# Patient Record
Sex: FEMALE | Race: WHITE | NOT HISPANIC OR LATINO | Employment: FULL TIME | URBAN - METROPOLITAN AREA
[De-identification: names, ages, dates, MRNs, and addresses within clinical notes are randomized per-mention and may not be internally consistent; named-entity substitution may affect disease eponyms.]

---

## 2024-05-19 ENCOUNTER — ANESTHESIA (OUTPATIENT)
Dept: PERIOP | Facility: HOSPITAL | Age: 64
End: 2024-05-19
Payer: COMMERCIAL

## 2024-05-19 ENCOUNTER — ANESTHESIA EVENT (OUTPATIENT)
Dept: PERIOP | Facility: HOSPITAL | Age: 64
End: 2024-05-19
Payer: COMMERCIAL

## 2024-05-19 ENCOUNTER — HOSPITAL ENCOUNTER (INPATIENT)
Facility: HOSPITAL | Age: 64
LOS: 1 days | Discharge: HOME | End: 2024-05-20
Attending: INTERNAL MEDICINE | Admitting: INTERNAL MEDICINE
Payer: COMMERCIAL

## 2024-05-19 ENCOUNTER — APPOINTMENT (OUTPATIENT)
Dept: GENERAL RADIOLOGY | Facility: HOSPITAL | Age: 64
End: 2024-05-19
Payer: COMMERCIAL

## 2024-05-19 PROBLEM — N13.2 HYDRONEPHROSIS CONCURRENT WITH AND DUE TO CALCULI OF KIDNEY AND URETER: Status: ACTIVE | Noted: 2024-05-19

## 2024-05-19 LAB
ANION GAP SERPL CALCULATED.3IONS-SCNC: 11 MMOL/L (ref 5–15)
BASOPHILS # BLD AUTO: 0.02 10*3/MM3 (ref 0–0.2)
BASOPHILS NFR BLD AUTO: 0.2 % (ref 0–1.5)
BUN SERPL-MCNC: 13 MG/DL (ref 8–23)
BUN/CREAT SERPL: 15.3 (ref 7–25)
CALCIUM SPEC-SCNC: 9.1 MG/DL (ref 8.6–10.5)
CHLORIDE SERPL-SCNC: 105 MMOL/L (ref 98–107)
CO2 SERPL-SCNC: 24 MMOL/L (ref 22–29)
CREAT SERPL-MCNC: 0.85 MG/DL (ref 0.57–1)
DEPRECATED RDW RBC AUTO: 41.4 FL (ref 37–54)
EGFRCR SERPLBLD CKD-EPI 2021: 76.6 ML/MIN/1.73
EOSINOPHIL # BLD AUTO: 0.1 10*3/MM3 (ref 0–0.4)
EOSINOPHIL NFR BLD AUTO: 0.9 % (ref 0.3–6.2)
ERYTHROCYTE [DISTWIDTH] IN BLOOD BY AUTOMATED COUNT: 14.1 % (ref 12.3–15.4)
GLUCOSE BLDC GLUCOMTR-MCNC: 139 MG/DL (ref 70–105)
GLUCOSE BLDC GLUCOMTR-MCNC: 414 MG/DL (ref 70–105)
GLUCOSE BLDC GLUCOMTR-MCNC: 92 MG/DL (ref 70–105)
GLUCOSE SERPL-MCNC: 127 MG/DL (ref 65–99)
HCT VFR BLD AUTO: 34.9 % (ref 34–46.6)
HGB BLD-MCNC: 10.7 G/DL (ref 12–15.9)
IMM GRANULOCYTES # BLD AUTO: 0.07 10*3/MM3 (ref 0–0.05)
IMM GRANULOCYTES NFR BLD AUTO: 0.6 % (ref 0–0.5)
LYMPHOCYTES # BLD AUTO: 2.26 10*3/MM3 (ref 0.7–3.1)
LYMPHOCYTES NFR BLD AUTO: 20.4 % (ref 19.6–45.3)
MCH RBC QN AUTO: 24.6 PG (ref 26.6–33)
MCHC RBC AUTO-ENTMCNC: 30.7 G/DL (ref 31.5–35.7)
MCV RBC AUTO: 80.2 FL (ref 79–97)
MONOCYTES # BLD AUTO: 0.71 10*3/MM3 (ref 0.1–0.9)
MONOCYTES NFR BLD AUTO: 6.4 % (ref 5–12)
NEUTROPHILS NFR BLD AUTO: 7.93 10*3/MM3 (ref 1.7–7)
NEUTROPHILS NFR BLD AUTO: 71.5 % (ref 42.7–76)
NRBC BLD AUTO-RTO: 0 /100 WBC (ref 0–0.2)
PLATELET # BLD AUTO: 158 10*3/MM3 (ref 140–450)
PMV BLD AUTO: 11.7 FL (ref 6–12)
POTASSIUM SERPL-SCNC: 3.1 MMOL/L (ref 3.5–5.2)
RBC # BLD AUTO: 4.35 10*6/MM3 (ref 3.77–5.28)
SODIUM SERPL-SCNC: 140 MMOL/L (ref 136–145)
WBC NRBC COR # BLD AUTO: 11.09 10*3/MM3 (ref 3.4–10.8)

## 2024-05-19 PROCEDURE — 63710000001 INSULIN GLARGINE PER 5 UNITS: Performed by: UROLOGY

## 2024-05-19 PROCEDURE — 76000 FLUOROSCOPY <1 HR PHYS/QHP: CPT

## 2024-05-19 PROCEDURE — 82948 REAGENT STRIP/BLOOD GLUCOSE: CPT

## 2024-05-19 PROCEDURE — 0 IOHEXOL 300 MG/ML SOLUTION: Performed by: UROLOGY

## 2024-05-19 PROCEDURE — BT141ZZ FLUOROSCOPY OF KIDNEYS, URETERS AND BLADDER USING LOW OSMOLAR CONTRAST: ICD-10-PCS | Performed by: UROLOGY

## 2024-05-19 PROCEDURE — 25010000002 CEFTRIAXONE PER 250 MG: Performed by: UROLOGY

## 2024-05-19 PROCEDURE — 25010000002 ONDANSETRON PER 1 MG

## 2024-05-19 PROCEDURE — 25010000002 FENTANYL CITRATE (PF) 100 MCG/2ML SOLUTION: Performed by: ANESTHESIOLOGY

## 2024-05-19 PROCEDURE — 25010000002 CEFAZOLIN PER 500 MG: Performed by: ANESTHESIOLOGY

## 2024-05-19 PROCEDURE — C1758 CATHETER, URETERAL: HCPCS | Performed by: UROLOGY

## 2024-05-19 PROCEDURE — 85025 COMPLETE CBC W/AUTO DIFF WBC: CPT

## 2024-05-19 PROCEDURE — 25010000002 DEXAMETHASONE PER 1 MG: Performed by: ANESTHESIOLOGY

## 2024-05-19 PROCEDURE — 87040 BLOOD CULTURE FOR BACTERIA: CPT

## 2024-05-19 PROCEDURE — C2617 STENT, NON-COR, TEM W/O DEL: HCPCS | Performed by: UROLOGY

## 2024-05-19 PROCEDURE — 25010000002 PROPOFOL 200 MG/20ML EMULSION: Performed by: ANESTHESIOLOGY

## 2024-05-19 PROCEDURE — 0T788DZ DILATION OF BILATERAL URETERS WITH INTRALUMINAL DEVICE, VIA NATURAL OR ARTIFICIAL OPENING ENDOSCOPIC: ICD-10-PCS | Performed by: UROLOGY

## 2024-05-19 PROCEDURE — 80048 BASIC METABOLIC PNL TOTAL CA: CPT

## 2024-05-19 PROCEDURE — C1769 GUIDE WIRE: HCPCS | Performed by: UROLOGY

## 2024-05-19 RX ORDER — AMOXICILLIN 250 MG
2 CAPSULE ORAL 2 TIMES DAILY PRN
Status: DISCONTINUED | OUTPATIENT
Start: 2024-05-19 | End: 2024-05-20 | Stop reason: HOSPADM

## 2024-05-19 RX ORDER — ATORVASTATIN CALCIUM 40 MG/1
80 TABLET, FILM COATED ORAL NIGHTLY
Status: DISCONTINUED | OUTPATIENT
Start: 2024-05-19 | End: 2024-05-20 | Stop reason: HOSPADM

## 2024-05-19 RX ORDER — ATORVASTATIN CALCIUM 80 MG/1
1 TABLET, FILM COATED ORAL NIGHTLY
COMMUNITY
Start: 2024-04-22

## 2024-05-19 RX ORDER — UREA 10 %
5 LOTION (ML) TOPICAL NIGHTLY PRN
Status: DISCONTINUED | OUTPATIENT
Start: 2024-05-19 | End: 2024-05-20 | Stop reason: HOSPADM

## 2024-05-19 RX ORDER — NICOTINE POLACRILEX 4 MG
15 LOZENGE BUCCAL
Status: DISCONTINUED | OUTPATIENT
Start: 2024-05-19 | End: 2024-05-20 | Stop reason: HOSPADM

## 2024-05-19 RX ORDER — LOSARTAN POTASSIUM 25 MG/1
1 TABLET ORAL DAILY
COMMUNITY
Start: 2024-04-23

## 2024-05-19 RX ORDER — SERTRALINE HYDROCHLORIDE 100 MG/1
100 TABLET, FILM COATED ORAL DAILY
Status: DISCONTINUED | OUTPATIENT
Start: 2024-05-19 | End: 2024-05-20 | Stop reason: HOSPADM

## 2024-05-19 RX ORDER — INSULIN GLARGINE 100 [IU]/ML
40 INJECTION, SOLUTION SUBCUTANEOUS DAILY
COMMUNITY
Start: 2024-05-08

## 2024-05-19 RX ORDER — DEXAMETHASONE SODIUM PHOSPHATE 4 MG/ML
INJECTION, SOLUTION INTRA-ARTICULAR; INTRALESIONAL; INTRAMUSCULAR; INTRAVENOUS; SOFT TISSUE AS NEEDED
Status: DISCONTINUED | OUTPATIENT
Start: 2024-05-19 | End: 2024-05-19 | Stop reason: SURG

## 2024-05-19 RX ORDER — DEXTROSE MONOHYDRATE 25 G/50ML
25 INJECTION, SOLUTION INTRAVENOUS
Status: DISCONTINUED | OUTPATIENT
Start: 2024-05-19 | End: 2024-05-20 | Stop reason: HOSPADM

## 2024-05-19 RX ORDER — INSULIN LISPRO 100 [IU]/ML
3-14 INJECTION, SOLUTION INTRAVENOUS; SUBCUTANEOUS EVERY 6 HOURS SCHEDULED
Status: DISCONTINUED | OUTPATIENT
Start: 2024-05-19 | End: 2024-05-19

## 2024-05-19 RX ORDER — FENTANYL CITRATE 50 UG/ML
INJECTION, SOLUTION INTRAMUSCULAR; INTRAVENOUS AS NEEDED
Status: DISCONTINUED | OUTPATIENT
Start: 2024-05-19 | End: 2024-05-19 | Stop reason: SURG

## 2024-05-19 RX ORDER — ACETAMINOPHEN 325 MG/1
650 TABLET ORAL EVERY 4 HOURS PRN
Status: DISCONTINUED | OUTPATIENT
Start: 2024-05-19 | End: 2024-05-20 | Stop reason: HOSPADM

## 2024-05-19 RX ORDER — EZETIMIBE 10 MG/1
1 TABLET ORAL NIGHTLY
COMMUNITY
Start: 2024-05-08

## 2024-05-19 RX ORDER — SERTRALINE HYDROCHLORIDE 100 MG/1
1 TABLET, FILM COATED ORAL NIGHTLY
COMMUNITY
Start: 2024-05-09

## 2024-05-19 RX ORDER — IBUPROFEN 600 MG/1
1 TABLET ORAL
Status: DISCONTINUED | OUTPATIENT
Start: 2024-05-19 | End: 2024-05-20 | Stop reason: HOSPADM

## 2024-05-19 RX ORDER — SODIUM CHLORIDE 9 MG/ML
40 INJECTION, SOLUTION INTRAVENOUS AS NEEDED
Status: DISCONTINUED | OUTPATIENT
Start: 2024-05-19 | End: 2024-05-20 | Stop reason: HOSPADM

## 2024-05-19 RX ORDER — ACETAMINOPHEN 160 MG/5ML
650 SOLUTION ORAL EVERY 4 HOURS PRN
Status: DISCONTINUED | OUTPATIENT
Start: 2024-05-19 | End: 2024-05-20 | Stop reason: HOSPADM

## 2024-05-19 RX ORDER — LOSARTAN POTASSIUM 25 MG/1
25 TABLET ORAL DAILY
Status: DISCONTINUED | OUTPATIENT
Start: 2024-05-19 | End: 2024-05-20 | Stop reason: HOSPADM

## 2024-05-19 RX ORDER — PROPOFOL 10 MG/ML
INJECTION, EMULSION INTRAVENOUS AS NEEDED
Status: DISCONTINUED | OUTPATIENT
Start: 2024-05-19 | End: 2024-05-19 | Stop reason: SURG

## 2024-05-19 RX ORDER — BISACODYL 5 MG/1
5 TABLET, DELAYED RELEASE ORAL DAILY PRN
Status: DISCONTINUED | OUTPATIENT
Start: 2024-05-19 | End: 2024-05-20 | Stop reason: HOSPADM

## 2024-05-19 RX ORDER — FENTANYL CITRATE 50 UG/ML
25 INJECTION, SOLUTION INTRAMUSCULAR; INTRAVENOUS
Status: DISCONTINUED | OUTPATIENT
Start: 2024-05-19 | End: 2024-05-19 | Stop reason: HOSPADM

## 2024-05-19 RX ORDER — SODIUM CHLORIDE 0.9 % (FLUSH) 0.9 %
10 SYRINGE (ML) INJECTION AS NEEDED
Status: DISCONTINUED | OUTPATIENT
Start: 2024-05-19 | End: 2024-05-20 | Stop reason: HOSPADM

## 2024-05-19 RX ORDER — ATENOLOL 50 MG/1
100 TABLET ORAL DAILY
Status: DISCONTINUED | OUTPATIENT
Start: 2024-05-19 | End: 2024-05-20 | Stop reason: HOSPADM

## 2024-05-19 RX ORDER — ONDANSETRON 2 MG/ML
4 INJECTION INTRAMUSCULAR; INTRAVENOUS EVERY 6 HOURS PRN
Status: DISCONTINUED | OUTPATIENT
Start: 2024-05-19 | End: 2024-05-19

## 2024-05-19 RX ORDER — SODIUM CHLORIDE 0.9 % (FLUSH) 0.9 %
10 SYRINGE (ML) INJECTION EVERY 12 HOURS SCHEDULED
Status: DISCONTINUED | OUTPATIENT
Start: 2024-05-19 | End: 2024-05-20 | Stop reason: HOSPADM

## 2024-05-19 RX ORDER — BISACODYL 10 MG
10 SUPPOSITORY, RECTAL RECTAL DAILY PRN
Status: DISCONTINUED | OUTPATIENT
Start: 2024-05-19 | End: 2024-05-20 | Stop reason: HOSPADM

## 2024-05-19 RX ORDER — ONDANSETRON 2 MG/ML
4 INJECTION INTRAMUSCULAR; INTRAVENOUS EVERY 4 HOURS PRN
Status: DISCONTINUED | OUTPATIENT
Start: 2024-05-19 | End: 2024-05-20 | Stop reason: HOSPADM

## 2024-05-19 RX ORDER — POLYETHYLENE GLYCOL 3350 17 G/17G
17 POWDER, FOR SOLUTION ORAL DAILY PRN
Status: DISCONTINUED | OUTPATIENT
Start: 2024-05-19 | End: 2024-05-20 | Stop reason: HOSPADM

## 2024-05-19 RX ORDER — ATENOLOL 100 MG/1
100 TABLET ORAL DAILY
COMMUNITY
Start: 2024-05-09

## 2024-05-19 RX ORDER — ACETAMINOPHEN 650 MG/1
650 SUPPOSITORY RECTAL EVERY 4 HOURS PRN
Status: DISCONTINUED | OUTPATIENT
Start: 2024-05-19 | End: 2024-05-20 | Stop reason: HOSPADM

## 2024-05-19 RX ORDER — INSULIN LISPRO 100 [IU]/ML
3-14 INJECTION, SOLUTION INTRAVENOUS; SUBCUTANEOUS
Status: DISCONTINUED | OUTPATIENT
Start: 2024-05-20 | End: 2024-05-20 | Stop reason: HOSPADM

## 2024-05-19 RX ADMIN — DEXAMETHASONE SODIUM PHOSPHATE 4 MG: 4 INJECTION, SOLUTION INTRA-ARTICULAR; INTRALESIONAL; INTRAMUSCULAR; INTRAVENOUS; SOFT TISSUE at 14:09

## 2024-05-19 RX ADMIN — Medication 10 ML: at 10:40

## 2024-05-19 RX ADMIN — ATORVASTATIN CALCIUM 80 MG: 40 TABLET, FILM COATED ORAL at 20:48

## 2024-05-19 RX ADMIN — PROPOFOL 200 MG: 10 INJECTION, EMULSION INTRAVENOUS at 13:58

## 2024-05-19 RX ADMIN — ONDANSETRON 4 MG: 2 INJECTION INTRAMUSCULAR; INTRAVENOUS at 14:14

## 2024-05-19 RX ADMIN — ACETAMINOPHEN 650 MG: 325 TABLET, FILM COATED ORAL at 19:38

## 2024-05-19 RX ADMIN — CEFTRIAXONE 1000 MG: 1 INJECTION, POWDER, FOR SOLUTION INTRAMUSCULAR; INTRAVENOUS at 20:49

## 2024-05-19 RX ADMIN — Medication 10 ML: at 20:49

## 2024-05-19 RX ADMIN — ONDANSETRON 4 MG: 2 INJECTION INTRAMUSCULAR; INTRAVENOUS at 10:39

## 2024-05-19 RX ADMIN — CEFAZOLIN 2 G: 2 INJECTION, POWDER, FOR SOLUTION INTRAMUSCULAR; INTRAVENOUS at 14:05

## 2024-05-19 RX ADMIN — INSULIN GLARGINE 30 UNITS: 100 INJECTION, SOLUTION SUBCUTANEOUS at 20:48

## 2024-05-19 RX ADMIN — FENTANYL CITRATE 50 MCG: 50 INJECTION, SOLUTION INTRAMUSCULAR; INTRAVENOUS at 14:06

## 2024-05-19 NOTE — ANESTHESIA POSTPROCEDURE EVALUATION
Patient: Yolanda Lovett    Procedure Summary       Date: 05/19/24 Room / Location: [facility] OR 11 / [facility] MAIN OR    Anesthesia Start: 1356 Anesthesia Stop: 1445    Procedure: CYSTOSCOPY, BILATERAL RETROGRADE PYELOGRAMS, BILATERAL URETERAL STENTS (Bilateral) Diagnosis:     Surgeons: Randal Cano MD Provider: Lexa Adames MD    Anesthesia Type: general ASA Status: 3 - Emergent            Anesthesia Type: general    Vitals  Vitals Value Taken Time   /72 05/19/24 1533   Temp 96.9 °F (36.1 °C) 05/19/24 1445   Pulse 75 05/19/24 1537   Resp 16 05/19/24 1525   SpO2 97 % 05/19/24 1537   Vitals shown include unfiled device data.        Post Anesthesia Care and Evaluation    Patient location during evaluation: PACU  Patient participation: complete - patient participated  Level of consciousness: awake  Pain score: 0  Pain management: adequate  Anesthetic complications: No anesthetic complications  PONV Status: none  Cardiovascular status: acceptable  Respiratory status: acceptable  Hydration status: acceptable

## 2024-05-19 NOTE — PLAN OF CARE
Goal Outcome Evaluation:  Plan of Care Reviewed With: patient        Progress: no change  Outcome Evaluation: Patient was a direct admit this morning from [facility]. She was admitted for bilateral kidney stones with hydronephrosis. Patient was allowed to have a snack when she got here and then was made NPO. Urology consult placed. IV Rocephin to start 5/19 @ 2100 since she received a dose at [facility] before coming over. Patient c/o a 2/10 pain and some slight nausea. Will continue to monitor.

## 2024-05-19 NOTE — H&P
[facility] Medicine Services  History & Physical    Patient Name: Yolanda Lovett  : 1960  MRN: [id number]  Primary Care Physician:  Provider, No Known  Date of admission: 2024  Date and Time of Service: 2024 at 0245      Assessment & Plan      Chief Complaint: abdominal pain    Plan:    Bilateral Ureter Calculi with Hydronephrosis  UTI  Lower back pain radiating to the LLQ  -CT of the abdomen and pelvis reviewed, 3 adjacent calculi noted in the left mid ureter measuring up to 8.6 mm with moderate hydronephrosis and hydroureter, and perinephric edema.  The right distal ureter shows a collection of urinary calculi measuring up to 4.2 mm at the UVJ with moderate hydronephrosis and hydroureter and perinephric edema  -UA shows 6-10 WBC, trace bacteria  -WBC 15.8, monitor  -Blood cultures ordered  -Rocephin ordered  -N.p.o. for possible procedure  -Analgesics and antiemetics as needed  -[facility] consulted    Diabetes Mellitus Type 2  -Accu-Cheks AC at bedtime  -SSI ordered  -Hold home metformin  -Continue home Lantus    Essential Hypertension  -Controlled  -Monitor BP  -Continue home atenolol, losartan    Hyperlipidemia  -Continue atorvastatin    Depression  -Continue Zoloft    History of Present Illness     History of Present Illness: Yolanda Lovett is a 63 y.o. female with a previous medical history of DM, hypertension, hyperlipidemia, and depression who presented to [facility] on 2024 with complaints of lower back pain radiating to her left lower abdomen along with associated nausea for the past 2 days.  She denies fevers, chills.    At [facility], BBC was 15.8.  Otherwise, labs were unremarkable.  UA showed 6-10 WBCs and trace bacteria. CT of the abdomen and pelvis reviewed, 3 adjacent calculi noted in the left mid ureter measuring up to 8.6 mm with moderate hydronephrosis and hydroureter, and perinephric edema.  The right distal ureter shows a collection  of urinary calculi measuring up to 4.2 mm at the UVJ with moderate hydronephrosis and hydroureter and perinephric edema.  She was afebrile, all vital signs were stable.  Urology was consulted and agreed to see patient.  Hospitalist was consulted for transfer to [facility] for further management.    On exam, the patient confirmed the above HPI, she reports that her pain is partially improved following pain medication.  On arrival, she was afebrile, all vitals were stable.    12 point ROS reviewed and negative except as mentioned above    Objective      Vitals:      There is no height or weight on file to calculate BMI.    Physical Exam  Vitals and nursing note reviewed.   Constitutional:       Appearance: Normal appearance.   HENT:      Mouth/Throat:      Mouth: Mucous membranes are moist.   Cardiovascular:      Rate and Rhythm: Normal rate and regular rhythm.   Pulmonary:      Effort: Pulmonary effort is normal.      Breath sounds: Normal breath sounds.   Abdominal:      General: Bowel sounds are normal.      Palpations: Abdomen is soft.      Tenderness:  in the left lower quadrant   Musculoskeletal:         General: Normal range of motion.   Skin:     General: Skin is warm and dry.   Neurological:      General: No focal deficit present.      Mental Status: She is alert and oriented to person, place, and time. Mental status is at baseline.   Psychiatric:         Mood and Affect: Mood normal.         Behavior: Behavior normal.            Personal History     This is a 63 y.o. female with:    No past medical history on file.    No past surgical history on file.    Active and Resolved Problems  There are no hospital problems to display for this patient.      Family History: family history is not on file. Otherwise pertinent FHx was reviewed and not pertinent to current issue.    Social History:      Home Medications:  Prior to Admission Medications       None              Allergies:  Not on File        DVT  prophylaxis:  Mechanical DVT prophylaxis orders are present.        CODE STATUS:    Code Status (Patient has no pulse and is not breathing): CPR (Attempt to Resuscitate)  Medical Interventions (Patient has pulse or is breathing): Full Support        Admission Status:  I believe this patient meets inpatient status.    I discussed the patient's findings and my recommendations with patient.    Signature:     This document has been electronically signed by Sammi Jordan DNP, APRN, AGACNP-BC on May 19, 2024 02:46 EDT   [place] Hospitalist Team

## 2024-05-19 NOTE — OP NOTE
Operative Note      Yolanda Lovett  63 y.o.  1960  female  [id number]      5/19/2024  Surgeons and Role:     * Randal Cano MD - Primary   Pre-operative Diagnosis: bilateral ureteral stones  Post-operative Diagnosis: Same  Complications:None      Description of procedure:  Procedure(s) and Anesthesia Type:     * CYSTOSCOPY, BILATERAL RETROGRADE PYELOGRAMS, BILATERAL URETERAL STENTS - General  After informed consent was obtained she was brought to the OR and placed in a lithotomy position. After she was prepped and draped, rigid cystoscopy was performed. Bilateral retrograde pyelograms were performed. Over guidewires bilateral 6 Fr VL stents were placed.     Interpretation of RPG- severe hydronephrosis bilaterally. Impacted stones in left proximal ureter and stones in rt ureter.    Specimens:      Estimated Blood Loss: 0  Randal Cano MD  5/19/2024

## 2024-05-19 NOTE — ANESTHESIA PREPROCEDURE EVALUATION
Anesthesia Evaluation     Patient summary reviewed and Nursing notes reviewed   NPO Solid Status: > 8 hours             Airway   Mallampati: II  TM distance: >3 FB  Neck ROM: full  No difficulty expected  Dental - normal exam     Pulmonary - normal exam   (+) a smoker Current, cigarettes,  Cardiovascular - normal exam    (+) hypertension, CAD, hyperlipidemia  (-) angina      Neuro/Psych- negative ROS  GI/Hepatic/Renal/Endo    (+) renal disease- stones, diabetes mellitus type 2 well controlled using insulin    Musculoskeletal (-) negative ROS    Abdominal  - normal exam    Bowel sounds: normal.   Substance History - negative use     OB/GYN negative ob/gyn ROS         Other                    Anesthesia Plan    ASA 3 - emergent     general       Anesthetic plan, risks, benefits, and alternatives have been provided, discussed and informed consent has been obtained with: patient.    CODE STATUS:    Code Status (Patient has no pulse and is not breathing): CPR (Attempt to Resuscitate)  Medical Interventions (Patient has pulse or is breathing): Full Support

## 2024-05-19 NOTE — ANESTHESIA PROCEDURE NOTES
Airway  Date/Time: 5/19/2024 2:01 PM    General Information and Staff    Patient location during procedure: OR  Anesthesiologist: Lexa Adames MD    Indications and Patient Condition    Preoxygenated: yes  Mask difficulty assessment: 0 - not attempted    Final Airway Details  Final airway type: supraglottic airway      Successful airway: LMA  Size 4     Number of attempts at approach: 1  Assessment: lips, teeth, and gum same as pre-op and atraumatic intubation

## 2024-05-19 NOTE — CONSULTS
Urology Consult  Patient Identification:  Name: Yolanda Lovett  Age: 63 y.o.  Sex: female  : 1960  MRN: [id number]     Chief Complaint: bilateral flank pain    History of Present Illness: bilateral flank pain for 2 days. CT- bilateral ureteral and renal stones with hydro.      Problem List:  Active Hospital Problems    Diagnosis  POA    **Hydronephrosis concurrent with and due to calculi of kidney and ureter [N13.2]  Yes      Resolved Hospital Problems   No resolved problems to display.     Past Medical History:  Past Medical History:   Diagnosis Date    Arthritis     Coronary artery disease     Diabetes mellitus     Hyperlipidemia     Hypertension      Past Surgical History:  Past Surgical History:   Procedure Laterality Date    LAPAROSCOPIC TUBAL LIGATION      TONSILLECTOMY        Home Meds:  Medications Prior to Admission   Medication Sig Dispense Refill Last Dose    atenolol (TENORMIN) 100 MG tablet Take 1 tablet by mouth Daily.   2024    atorvastatin (LIPITOR) 80 MG tablet Take 1 tablet by mouth Every Night.   2024    ezetimibe (ZETIA) 10 MG tablet Take 1 tablet by mouth Every Night.   2024    Lantus SoloStar 100 UNIT/ML injection pen Inject 40 Units under the skin into the appropriate area as directed Daily. 40 units in AM, 30 units in PM   2024    losartan (COZAAR) 25 MG tablet Take 1 tablet by mouth Daily.   2024    metFORMIN (GLUCOPHAGE) 1000 MG tablet Take 1 tablet by mouth 2 (Two) Times a Day With Meals.   2024    sertraline (ZOLOFT) 100 MG tablet Take 1 tablet by mouth Daily.   2024     Current Meds:     Current Facility-Administered Medications:     acetaminophen (TYLENOL) tablet 650 mg, 650 mg, Oral, Q4H PRN **OR** acetaminophen (TYLENOL) 160 MG/5ML oral solution 650 mg, 650 mg, Oral, Q4H PRN **OR** acetaminophen (TYLENOL) suppository 650 mg, 650 mg, Rectal, Q4H PRN, Sammi Jordan APRN    atenolol (TENORMIN) tablet 100 mg, 100 mg, Oral, Daily, Nikki  ARABELLA Cross    atorvastatin (LIPITOR) tablet 80 mg, 80 mg, Oral, Nightly, Sammi Jordan APRN    sennosides-docusate (PERICOLACE) 8.6-50 MG per tablet 2 tablet, 2 tablet, Oral, BID PRN **AND** polyethylene glycol (MIRALAX) packet 17 g, 17 g, Oral, Daily PRN **AND** bisacodyl (DULCOLAX) EC tablet 5 mg, 5 mg, Oral, Daily PRN **AND** bisacodyl (DULCOLAX) suppository 10 mg, 10 mg, Rectal, Daily PRN, Sammi Jordan APRN    cefTRIAXone (ROCEPHIN) 1,000 mg in sodium chloride 0.9 % 100 mL MBP, 1,000 mg, Intravenous, Q24H, Sammi Jordan APRN    dextrose (D50W) (25 g/50 mL) IV injection 25 g, 25 g, Intravenous, Q15 Min PRN, Sammi Jordan APRN    dextrose (GLUTOSE) oral gel 15 g, 15 g, Oral, Q15 Min PRN, Sammi Jordan APRN    glucagon (GLUCAGEN) injection 1 mg, 1 mg, Intramuscular, Q15 Min PRN, Sammi Jordan APRN    insulin glargine (LANTUS, SEMGLEE) injection 30 Units, 30 Units, Subcutaneous, Nightly, Sammi Jordan APRN    insulin glargine (LANTUS, SEMGLEE) injection 40 Units, 40 Units, Subcutaneous, Daily, Sammi Jordan APRN    insulin lispro (HUMALOG/ADMELOG) injection 3-14 Units, 3-14 Units, Subcutaneous, Q6H, Sammi Jordan APRN    losartan (COZAAR) tablet 25 mg, 25 mg, Oral, Daily, Sammi Jordan APRN    melatonin tablet 5 mg, 5 mg, Oral, Nightly PRN, Sammi Jordan APRN    morphine injection 4 mg, 4 mg, Intravenous, Q4H PRN, Sammi Jordan APRN    ondansetron (ZOFRAN) injection 4 mg, 4 mg, Intravenous, Q4H PRN, Sammi Jordan, APRN    sertraline (ZOLOFT) tablet 100 mg, 100 mg, Oral, Daily, Sammi Jordan, ARABELLA    sodium chloride 0.9 % flush 10 mL, 10 mL, Intravenous, Q12H, Sammi Jordan, ARABELLA    sodium chloride 0.9 % flush 10 mL, 10 mL, Intravenous, PRN, Sammi Jordan, APRN    sodium chloride 0.9 % infusion 40 mL, 40 mL, Intravenous, PRN, Sammi Jordan, APRN  Allergies:  No Known Allergies  Immunizations:    There is no immunization history on file for this  "patient.  Social History:   Social History     Tobacco Use    Smoking status: Every Day     Current packs/day: 1.00     Average packs/day: 1 pack/day for 25.0 years (25.0 ttl pk-yrs)     Types: Cigarettes    Smokeless tobacco: Never   Substance Use Topics    Alcohol use: Never      Family History:  History reviewed. No pertinent family history.   Review of Systems  negative 12 point system review except:  Objective:  tMax 24 hrs: Temp (24hrs), Av.6 °F (36.4 °C), Min:97.6 °F (36.4 °C), Max:97.6 °F (36.4 °C)    Vitals Ranges:   Temp:  [97.6 °F (36.4 °C)] 97.6 °F (36.4 °C)  Heart Rate:  [78] 78  Resp:  [18] 18  BP: (146)/(71) 146/71  Intake and Output Last 3 Shifts:   No intake/output data recorded.  Exam:  /71 (BP Location: Left arm, Patient Position: Sitting)   Pulse 78   Temp 97.6 °F (36.4 °C) (Axillary)   Resp 18   Ht 157.5 cm (62\")   Wt 66 kg (145 lb 9.6 oz)   SpO2 97%   BMI 26.63 kg/m²       General Appearance: Alert, cooperative, no distress, appears stated age   Head: Normocephalic, without obvious abnormality, atraumatic   ENT: Normal hearing, external inspection, ears and nose   Eyes: Normal pupils/irises, external inspection, conjunctivae, eyelids   Back: No CVA tenderness   Respiratory: Normal effort, palpation, auscultation   CV: Normal auscultation, carotid pulses, no edema   Abdomen: No hernia, soft, nontender, no masses   :    Musculoskeletal: Normal extremities, nails, digits   Skin: Normal skin color, texture, no rashes or lesion   Neurologic/psych: Normal orientation, mood, affect           Data Review:  CBC:   Results from last 7 days   Lab Units 24  0413   WBC 10*3/mm3 11.09*   RBC 10*6/mm3 4.35     BMP:   Results from last 7 days   Lab Units 24  0413   GLUCOSE mg/dL 127*   CO2 mmol/L 24.0   BUN mg/dL 13   CREATININE mg/dL 0.85   CALCIUM mg/dL 9.1      Assessment:    Hydronephrosis concurrent with and due to calculi of kidney and ureter          Plan:  Cystoscopy, " bilateral retrograde pyelogram and stents  R/b/o discussed    Randal Cano MD  5/19/2024

## 2024-05-19 NOTE — PLAN OF CARE
Goal Outcome Evaluation:           Progress: no change  Outcome Evaluation: Patient resting in bed throughout shift. cystoscopy performed this afternoon. nausea treated per mar. no further complaints.

## 2024-05-20 VITALS
HEIGHT: 62 IN | HEART RATE: 66 BPM | DIASTOLIC BLOOD PRESSURE: 75 MMHG | SYSTOLIC BLOOD PRESSURE: 182 MMHG | WEIGHT: 145.6 LBS | BODY MASS INDEX: 26.79 KG/M2 | OXYGEN SATURATION: 96 % | RESPIRATION RATE: 16 BRPM | TEMPERATURE: 97.6 F

## 2024-05-20 LAB
ANION GAP SERPL CALCULATED.3IONS-SCNC: 10.9 MMOL/L (ref 5–15)
BASOPHILS # BLD AUTO: 0.02 10*3/MM3 (ref 0–0.2)
BASOPHILS NFR BLD AUTO: 0.2 % (ref 0–1.5)
BUN SERPL-MCNC: 13 MG/DL (ref 8–23)
BUN/CREAT SERPL: 19.4 (ref 7–25)
CALCIUM SPEC-SCNC: 9.1 MG/DL (ref 8.6–10.5)
CHLORIDE SERPL-SCNC: 105 MMOL/L (ref 98–107)
CO2 SERPL-SCNC: 23.1 MMOL/L (ref 22–29)
CREAT SERPL-MCNC: 0.67 MG/DL (ref 0.57–1)
DEPRECATED RDW RBC AUTO: 42.1 FL (ref 37–54)
EGFRCR SERPLBLD CKD-EPI 2021: 97.7 ML/MIN/1.73
EOSINOPHIL # BLD AUTO: 0.06 10*3/MM3 (ref 0–0.4)
EOSINOPHIL NFR BLD AUTO: 0.6 % (ref 0.3–6.2)
ERYTHROCYTE [DISTWIDTH] IN BLOOD BY AUTOMATED COUNT: 14.2 % (ref 12.3–15.4)
GLUCOSE BLDC GLUCOMTR-MCNC: 192 MG/DL (ref 70–105)
GLUCOSE BLDC GLUCOMTR-MCNC: 329 MG/DL (ref 70–105)
GLUCOSE SERPL-MCNC: 204 MG/DL (ref 65–99)
HCT VFR BLD AUTO: 36.6 % (ref 34–46.6)
HGB BLD-MCNC: 11 G/DL (ref 12–15.9)
IMM GRANULOCYTES # BLD AUTO: 0.05 10*3/MM3 (ref 0–0.05)
IMM GRANULOCYTES NFR BLD AUTO: 0.5 % (ref 0–0.5)
LYMPHOCYTES # BLD AUTO: 1.35 10*3/MM3 (ref 0.7–3.1)
LYMPHOCYTES NFR BLD AUTO: 12.9 % (ref 19.6–45.3)
MCH RBC QN AUTO: 24.5 PG (ref 26.6–33)
MCHC RBC AUTO-ENTMCNC: 30.1 G/DL (ref 31.5–35.7)
MCV RBC AUTO: 81.5 FL (ref 79–97)
MONOCYTES # BLD AUTO: 0.61 10*3/MM3 (ref 0.1–0.9)
MONOCYTES NFR BLD AUTO: 5.8 % (ref 5–12)
NEUTROPHILS NFR BLD AUTO: 8.39 10*3/MM3 (ref 1.7–7)
NEUTROPHILS NFR BLD AUTO: 80 % (ref 42.7–76)
NRBC BLD AUTO-RTO: 0 /100 WBC (ref 0–0.2)
PLATELET # BLD AUTO: 157 10*3/MM3 (ref 140–450)
PMV BLD AUTO: 11.4 FL (ref 6–12)
POTASSIUM SERPL-SCNC: 3.7 MMOL/L (ref 3.5–5.2)
RBC # BLD AUTO: 4.49 10*6/MM3 (ref 3.77–5.28)
SODIUM SERPL-SCNC: 139 MMOL/L (ref 136–145)
WBC NRBC COR # BLD AUTO: 10.48 10*3/MM3 (ref 3.4–10.8)

## 2024-05-20 PROCEDURE — 63710000001 INSULIN GLARGINE PER 5 UNITS: Performed by: UROLOGY

## 2024-05-20 PROCEDURE — 82948 REAGENT STRIP/BLOOD GLUCOSE: CPT | Performed by: INTERNAL MEDICINE

## 2024-05-20 PROCEDURE — 85025 COMPLETE CBC W/AUTO DIFF WBC: CPT | Performed by: UROLOGY

## 2024-05-20 PROCEDURE — 80048 BASIC METABOLIC PNL TOTAL CA: CPT | Performed by: UROLOGY

## 2024-05-20 PROCEDURE — 63710000001 INSULIN LISPRO (HUMAN) PER 5 UNITS: Performed by: INTERNAL MEDICINE

## 2024-05-20 PROCEDURE — 82948 REAGENT STRIP/BLOOD GLUCOSE: CPT

## 2024-05-20 RX ORDER — NITROFURANTOIN 25; 75 MG/1; MG/1
100 CAPSULE ORAL 2 TIMES DAILY
Qty: 10 CAPSULE | Refills: 0 | Status: SHIPPED | OUTPATIENT
Start: 2024-05-20 | End: 2024-05-23

## 2024-05-20 RX ADMIN — INSULIN GLARGINE 40 UNITS: 100 INJECTION, SOLUTION SUBCUTANEOUS at 08:43

## 2024-05-20 RX ADMIN — INSULIN LISPRO 3 UNITS: 100 INJECTION, SOLUTION INTRAVENOUS; SUBCUTANEOUS at 08:43

## 2024-05-20 RX ADMIN — SERTRALINE 100 MG: 100 TABLET, FILM COATED ORAL at 08:43

## 2024-05-20 RX ADMIN — INSULIN LISPRO 10 UNITS: 100 INJECTION, SOLUTION INTRAVENOUS; SUBCUTANEOUS at 11:58

## 2024-05-20 RX ADMIN — Medication 10 ML: at 08:43

## 2024-05-20 RX ADMIN — LOSARTAN POTASSIUM 25 MG: 25 TABLET, FILM COATED ORAL at 09:44

## 2024-05-20 RX ADMIN — ATENOLOL 100 MG: 50 TABLET ORAL at 08:43

## 2024-05-20 RX ADMIN — ACETAMINOPHEN 650 MG: 325 TABLET, FILM COATED ORAL at 04:12

## 2024-05-20 NOTE — PROGRESS NOTES
"  [facility] DAILY PROGRESS NOTE    Patient Identification  Name: Yolanda Lovett  Age: 63 y.o.  Sex: female  :  1960  MRN: [id number]    Date: 2024             Subjective:  Interval History: Status post stents    Objective:    Scheduled Meds:atenolol, 100 mg, Oral, Daily  atorvastatin, 80 mg, Oral, Nightly  cefTRIAXone, 1,000 mg, Intravenous, Q24H  insulin glargine, 30 Units, Subcutaneous, Nightly  insulin glargine, 40 Units, Subcutaneous, Daily  insulin lispro, 3-14 Units, Subcutaneous, 4x Daily With Meals & Nightly  losartan, 25 mg, Oral, Daily  sertraline, 100 mg, Oral, Daily  sodium chloride, 10 mL, Intravenous, Q12H      Continuous Infusions:   PRN Meds:  acetaminophen **OR** acetaminophen **OR** acetaminophen    senna-docusate sodium **AND** polyethylene glycol **AND** bisacodyl **AND** bisacodyl    dextrose    dextrose    glucagon (human recombinant)    melatonin    Morphine    ondansetron    sodium chloride    sodium chloride    Vital signs in last 24 hours:  Temp:  [96.9 °F (36.1 °C)-98.6 °F (37 °C)] 98.4 °F (36.9 °C)  Heart Rate:  [64-81] 64  Resp:  [13-20] 16  BP: (125-170)/(53-74) 170/73    Intake/Output:    Intake/Output Summary (Last 24 hours) at 2024 0951  Last data filed at 2024 0400  Gross per 24 hour   Intake 358 ml   Output 1700 ml   Net -1342 ml       Exam:  /73 (BP Location: Right arm, Patient Position: Lying)   Pulse 64   Temp 98.4 °F (36.9 °C) (Oral)   Resp 16   Ht 157.5 cm (62\")   Wt 66 kg (145 lb 9.6 oz)   SpO2 96%   BMI 26.63 kg/m²     General Appearance:    Alert, cooperative, NAD   Lungs:     Respirations unlabored, no audible wheezing    Heart:    No cyanosis   Abdomen:     Soft, ND    :    No suprapubic distention            Data Review:  All labs (24hrs):   Recent Results (from the past 24 hour(s))   POC Glucose Once    Collection Time: 24 11:56 AM    Specimen: Blood   Result Value Ref Range    Glucose 139 (H) 70 - 105 mg/dL   POC Glucose " Once    Collection Time: 05/19/24  8:41 PM    Specimen: Blood   Result Value Ref Range    Glucose 414 (C) 70 - 105 mg/dL   Basic Metabolic Panel    Collection Time: 05/20/24  4:18 AM    Specimen: Blood   Result Value Ref Range    Glucose 204 (H) 65 - 99 mg/dL    BUN 13 8 - 23 mg/dL    Creatinine 0.67 0.57 - 1.00 mg/dL    Sodium 139 136 - 145 mmol/L    Potassium 3.7 3.5 - 5.2 mmol/L    Chloride 105 98 - 107 mmol/L    CO2 23.1 22.0 - 29.0 mmol/L    Calcium 9.1 8.6 - 10.5 mg/dL    BUN/Creatinine Ratio 19.4 7.0 - 25.0    Anion Gap 10.9 5.0 - 15.0 mmol/L    eGFR 97.7 >60.0 mL/min/1.73   CBC Auto Differential    Collection Time: 05/20/24  4:18 AM    Specimen: Blood   Result Value Ref Range    WBC 10.48 3.40 - 10.80 10*3/mm3    RBC 4.49 3.77 - 5.28 10*6/mm3    Hemoglobin 11.0 (L) 12.0 - 15.9 g/dL    Hematocrit 36.6 34.0 - 46.6 %    MCV 81.5 79.0 - 97.0 fL    MCH 24.5 (L) 26.6 - 33.0 pg    MCHC 30.1 (L) 31.5 - 35.7 g/dL    RDW 14.2 12.3 - 15.4 %    RDW-SD 42.1 37.0 - 54.0 fl    MPV 11.4 6.0 - 12.0 fL    Platelets 157 140 - 450 10*3/mm3    Neutrophil % 80.0 (H) 42.7 - 76.0 %    Lymphocyte % 12.9 (L) 19.6 - 45.3 %    Monocyte % 5.8 5.0 - 12.0 %    Eosinophil % 0.6 0.3 - 6.2 %    Basophil % 0.2 0.0 - 1.5 %    Immature Grans % 0.5 0.0 - 0.5 %    Neutrophils, Absolute 8.39 (H) 1.70 - 7.00 10*3/mm3    Lymphocytes, Absolute 1.35 0.70 - 3.10 10*3/mm3    Monocytes, Absolute 0.61 0.10 - 0.90 10*3/mm3    Eosinophils, Absolute 0.06 0.00 - 0.40 10*3/mm3    Basophils, Absolute 0.02 0.00 - 0.20 10*3/mm3    Immature Grans, Absolute 0.05 0.00 - 0.05 10*3/mm3    nRBC 0.0 0.0 - 0.2 /100 WBC   POC Glucose Once    Collection Time: 05/20/24  7:15 AM    Specimen: Blood   Result Value Ref Range    Glucose 192 (H) 70 - 105 mg/dL      Imaging Results (Last 24 Hours)       Procedure Component Value Units Date/Time    FL < 1 Hour [[id number]] Resulted: 05/19/24 1435     Updated: 05/19/24 1435    Narrative:      This procedure was auto-finalized with no  dictation required.             Assessment:    Hydronephrosis concurrent with and due to calculi of kidney and ureter      Bilateral ureteral stones    Plan:    Status post stents yesterday  Okay for discharge from urology standpoint we will arrange outpatient stone intervention    Leon Rincon MD  [facility]  [facility] [facility], [facility] [address]  [place] [address]  Office: [phone]  Available via [employer] Secure Chat  05/20/24  09:51 EDT

## 2024-05-20 NOTE — PLAN OF CARE
Goal Outcome Evaluation:              Outcome Evaluation: Pt resting between care. Pain treated per MAR. Pt noted to have blood tinged urine post cysto with stent placement.

## 2024-05-20 NOTE — CASE MANAGEMENT/SOCIAL WORK
Discharge Planning Assessment   [place]     Patient Name: Yolanda Lovett  MRN: [id number]  Today's Date: 5/20/2024    Admit Date: 5/19/2024    Plan: Routine home with sister.   Discharge Needs Assessment       Row Name 05/20/24 1033       Living Environment    People in Home sibling(s)    Name(s) of People in Home Sister-Conchita, Brother in law-Chris    Current Living Arrangements home    Potentially Unsafe Housing Conditions none    In the past 12 months has the electric, gas, oil, or water company threatened to shut off services in your home? No    Primary Care Provided by self    Provides Primary Care For no one    Family Caregiver if Needed sibling(s)    Family Caregiver Names -Conchita    Quality of Family Relationships helpful;involved;supportive    Able to Return to Prior Arrangements yes       Resource/Environmental Concerns    Resource/Environmental Concerns none    Transportation Concerns none       Transportation Needs    In the past 12 months, has lack of transportation kept you from medical appointments or from getting medications? no    In the past 12 months, has lack of transportation kept you from meetings, work, or from getting things needed for daily living? No       Food Insecurity    Within the past 12 months, you worried that your food would run out before you got the money to buy more. Never true    Within the past 12 months, the food you bought just didn't last and you didn't have money to get more. Never true       Transition Planning    Patient/Family Anticipates Transition to home;home with family    Patient/Family Anticipated Services at Transition none    Transportation Anticipated car, drives self;family or friend will provide       Discharge Needs Assessment    Readmission Within the Last 30 Days no previous admission in last 30 days    Equipment Currently Used at Home none    Concerns to be Addressed denies needs/concerns at this time;no discharge needs identified    Anticipated  Changes Related to Illness none    Equipment Needed After Discharge none    Provided Post Acute Provider List? N/A    Provided Post Acute Provider Quality & Resource List? N/A                   Discharge Plan       Row Name 05/20/24 1033       Plan    Plan Routine home with sister.    Patient/Family in Agreement with Plan yes    Plan Comments CM met with patient and sister Conchita at bedside. Pt lives at sister's home, drives, and reports being independent with ADL's. PCP confirmed and updated in chart; pharmacy confirmed and enrolled in [facility]. Pt denies DME use at home and no current HHC/PT services. Sister will be providing transportation home at time of discharge.                   Demographic Summary       Row Name 05/20/24 1032       General Information    Admission Type inpatient    Arrived From emergency department;hospital    Referral Source admission list    Reason for Consult care coordination/care conference;discharge planning    Preferred Language English       Contact Information    Permission Granted to Share Info With                    Functional Status       Row Name 05/20/24 1032       Functional Status    Usual Activity Tolerance good    Current Activity Tolerance good       Functional Status, IADL    Medications independent    Meal Preparation independent    Housekeeping independent    Laundry independent    Shopping independent             Megan Naegele, RN     Office Phone: [phone]  Office Cell: [phone]

## 2024-05-20 NOTE — PAYOR COMM NOTE
"THIS IS INPATIENT ADMISSION FOR HENRY LOVETT.    INPATIENT ORDER 5/19/2024    AUTHORIZATION PENDING:   PLEASE FAX OR CALL DETERMINATION TO CONTACT BELOW:   THANK YOU.      Nadia Harvey RN, CCM  Utilization Nurse  [address] [address]   [phone]  Fx [phone]     Henry Lovett (63 y.o. Female)       Date of Birth   1960    Social Security Number       Address   [address] [place] [address]    Home Phone   [phone]    MRN   [id number]       Amish   None    Marital Status                               Admission Date   5/19/24    Admission Type   Emergency    Admitting Provider   Dez Stallings MD    Attending Provider   Karely Ashby MD    Department, Room/Bed   [facility] 3C MEDICAL INPATIENT, 381/1       Discharge Date       Discharge Disposition   Home or Self Care    Discharge Destination                                 Attending Provider: Karely Ashby MD    Allergies: No Known Allergies    Isolation: None   Infection: None   Code Status: CPR    Ht: 157.5 cm (62\")   Wt: 66 kg (145 lb 9.6 oz)    Admission Cmt: None   Principal Problem: Hydronephrosis concurrent with and due to calculi of kidney and ureter [N13.2]                   Active Insurance as of 5/19/2024       Primary Coverage       Payor Plan Insurance Group Employer/Plan Group    [facility] ANTH PATHWAY TRANSITION HMO NON PAR [id number]       Payor Plan Address Payor Plan Phone Number Payor Plan Fax Number Effective Dates    [address]   1/1/2024 - None Entered    [place] [address]         Subscriber Name Subscriber Birth Date Member ID       HENRY LOVETT 1960 [id number]                     Emergency Contacts        (Rel.) Home Phone Work Phone Mobile Phone    SHAWNEE ANNE (Other) -- -- [phone]    Conchita Caldwell -- -- [phone]                 History & Physical        Sammi Jordan, APRN " at 24 0245       Attestation signed by Dez Stallings MD at 24 0740    I have reviewed this documentation and agree.                      [facility] Medicine Services  History & Physical    Patient Name: Yolanda Lovett  : 1960  MRN: [id number]  Primary Care Physician:  Provider, No Known  Date of admission: 2024  Date and Time of Service: 2024 at 0245      Assessment & Plan      Chief Complaint: abdominal pain    Plan:    Bilateral Ureter Calculi with Hydronephrosis  UTI  Lower back pain radiating to the LLQ  -CT of the abdomen and pelvis reviewed, 3 adjacent calculi noted in the left mid ureter measuring up to 8.6 mm with moderate hydronephrosis and hydroureter, and perinephric edema.  The right distal ureter shows a collection of urinary calculi measuring up to 4.2 mm at the UVJ with moderate hydronephrosis and hydroureter and perinephric edema  -UA shows 6-10 WBC, trace bacteria  -WBC 15.8, monitor  -Blood cultures ordered  -Rocephin ordered  -N.p.o. for possible procedure  -Analgesics and antiemetics as needed  -[facility] consulted    Diabetes Mellitus Type 2  -Accu-Cheks AC at bedtime  -SSI ordered  -Hold home metformin  -Continue home Lantus    Essential Hypertension  -Controlled  -Monitor BP  -Continue home atenolol, losartan    Hyperlipidemia  -Continue atorvastatin    Depression  -Continue Zoloft    History of Present Illness     History of Present Illness: Yolanda Lovett is a 63 y.o. female with a previous medical history of DM, hypertension, hyperlipidemia, and depression who presented to [facility] on 2024 with complaints of lower back pain radiating to her left lower abdomen along with associated nausea for the past 2 days.  She denies fevers, chills.    At [facility], BBC was 15.8.  Otherwise, labs were unremarkable.  UA showed 6-10 WBCs and trace bacteria. CT of the abdomen and pelvis reviewed, 3 adjacent calculi noted in the  left mid ureter measuring up to 8.6 mm with moderate hydronephrosis and hydroureter, and perinephric edema.  The right distal ureter shows a collection of urinary calculi measuring up to 4.2 mm at the UVJ with moderate hydronephrosis and hydroureter and perinephric edema.  She was afebrile, all vital signs were stable.  Urology was consulted and agreed to see patient.  Hospitalist was consulted for transfer to [facility] for further management.    On exam, the patient confirmed the above HPI, she reports that her pain is partially improved following pain medication.  On arrival, she was afebrile, all vitals were stable.    12 point ROS reviewed and negative except as mentioned above    Objective      Vitals:      There is no height or weight on file to calculate BMI.    Physical Exam  Vitals and nursing note reviewed.   Constitutional:       Appearance: Normal appearance.   HENT:      Mouth/Throat:      Mouth: Mucous membranes are moist.   Cardiovascular:      Rate and Rhythm: Normal rate and regular rhythm.   Pulmonary:      Effort: Pulmonary effort is normal.      Breath sounds: Normal breath sounds.   Abdominal:      General: Bowel sounds are normal.      Palpations: Abdomen is soft.      Tenderness:  in the left lower quadrant   Musculoskeletal:         General: Normal range of motion.   Skin:     General: Skin is warm and dry.   Neurological:      General: No focal deficit present.      Mental Status: She is alert and oriented to person, place, and time. Mental status is at baseline.   Psychiatric:         Mood and Affect: Mood normal.         Behavior: Behavior normal.            Personal History     This is a 63 y.o. female with:    No past medical history on file.    No past surgical history on file.    Active and Resolved Problems  There are no hospital problems to display for this patient.      Family History: family history is not on file. Otherwise pertinent FHx was reviewed and not pertinent  to current issue.    Social History:      Home Medications:  Prior to Admission Medications       None              Allergies:  Not on File        DVT prophylaxis:  Mechanical DVT prophylaxis orders are present.        CODE STATUS:    Code Status (Patient has no pulse and is not breathing): CPR (Attempt to Resuscitate)  Medical Interventions (Patient has pulse or is breathing): Full Support        Admission Status:  I believe this patient meets inpatient status.    I discussed the patient's findings and my recommendations with patient.    Signature:     This document has been electronically signed by Sammi Jordan, WIN, APRN, [facility] on May 19, 2024 02:46 EDT   [facility]    Electronically signed by Dez Stallings MD at 05/19/24 0740          Operative/Procedure Notes (last 48 hours)        Randal Cano MD at 05/19/24 1411          Operative Note      Yolanda GARCIA Glendo  63 y.o.  1960  female  [id number]      5/19/2024  Surgeons and Role:     * Randal Cano MD - Primary   Pre-operative Diagnosis: bilateral ureteral stones  Post-operative Diagnosis: Same  Complications:None      Description of procedure:  Procedure(s) and Anesthesia Type:     * CYSTOSCOPY, BILATERAL RETROGRADE PYELOGRAMS, BILATERAL URETERAL STENTS - General  After informed consent was obtained she was brought to the OR and placed in a lithotomy position. After she was prepped and draped, rigid cystoscopy was performed. Bilateral retrograde pyelograms were performed. Over guidewires bilateral 6 Fr VL stents were placed.     Interpretation of RPG- severe hydronephrosis bilaterally. Impacted stones in left proximal ureter and stones in rt ureter.    Specimens:      Estimated Blood Loss: 0  Randal Cano MD  5/19/2024    Electronically signed by Randal Cano MD at 05/19/24 1456          Physician Progress Notes (last 48 hours)        Leon Rincon MD at 05/20/24 0962            [facility] DAILY PROGRESS  "NOTE    Patient Identification  Name: Yoladna Lovett  Age: 63 y.o.  Sex: female  :  1960  MRN: [id number]    Date: 2024             Subjective:  Interval History: Status post stents    Objective:    Scheduled Meds:atenolol, 100 mg, Oral, Daily  atorvastatin, 80 mg, Oral, Nightly  cefTRIAXone, 1,000 mg, Intravenous, Q24H  insulin glargine, 30 Units, Subcutaneous, Nightly  insulin glargine, 40 Units, Subcutaneous, Daily  insulin lispro, 3-14 Units, Subcutaneous, 4x Daily With Meals & Nightly  losartan, 25 mg, Oral, Daily  sertraline, 100 mg, Oral, Daily  sodium chloride, 10 mL, Intravenous, Q12H      Continuous Infusions:   PRN Meds:  acetaminophen **OR** acetaminophen **OR** acetaminophen    senna-docusate sodium **AND** polyethylene glycol **AND** bisacodyl **AND** bisacodyl    dextrose    dextrose    glucagon (human recombinant)    melatonin    Morphine    ondansetron    sodium chloride    sodium chloride    Vital signs in last 24 hours:  Temp:  [96.9 °F (36.1 °C)-98.6 °F (37 °C)] 98.4 °F (36.9 °C)  Heart Rate:  [64-81] 64  Resp:  [13-20] 16  BP: (125-170)/(53-74) 170/73    Intake/Output:    Intake/Output Summary (Last 24 hours) at 2024 0951  Last data filed at 2024 0400  Gross per 24 hour   Intake 358 ml   Output 1700 ml   Net -1342 ml       Exam:  /73 (BP Location: Right arm, Patient Position: Lying)   Pulse 64   Temp 98.4 °F (36.9 °C) (Oral)   Resp 16   Ht 157.5 cm (62\")   Wt 66 kg (145 lb 9.6 oz)   SpO2 96%   BMI 26.63 kg/m²     General Appearance:    Alert, cooperative, NAD   Lungs:     Respirations unlabored, no audible wheezing    Heart:    No cyanosis   Abdomen:     Soft, ND    :    No suprapubic distention            Data Review:  All labs (24hrs):   Recent Results (from the past 24 hour(s))   POC Glucose Once    Collection Time: 24 11:56 AM    Specimen: Blood   Result Value Ref Range    Glucose 139 (H) 70 - 105 mg/dL   POC Glucose Once    Collection Time: " 05/19/24  8:41 PM    Specimen: Blood   Result Value Ref Range    Glucose 414 (C) 70 - 105 mg/dL   Basic Metabolic Panel    Collection Time: 05/20/24  4:18 AM    Specimen: Blood   Result Value Ref Range    Glucose 204 (H) 65 - 99 mg/dL    BUN 13 8 - 23 mg/dL    Creatinine 0.67 0.57 - 1.00 mg/dL    Sodium 139 136 - 145 mmol/L    Potassium 3.7 3.5 - 5.2 mmol/L    Chloride 105 98 - 107 mmol/L    CO2 23.1 22.0 - 29.0 mmol/L    Calcium 9.1 8.6 - 10.5 mg/dL    BUN/Creatinine Ratio 19.4 7.0 - 25.0    Anion Gap 10.9 5.0 - 15.0 mmol/L    eGFR 97.7 >60.0 mL/min/1.73   CBC Auto Differential    Collection Time: 05/20/24  4:18 AM    Specimen: Blood   Result Value Ref Range    WBC 10.48 3.40 - 10.80 10*3/mm3    RBC 4.49 3.77 - 5.28 10*6/mm3    Hemoglobin 11.0 (L) 12.0 - 15.9 g/dL    Hematocrit 36.6 34.0 - 46.6 %    MCV 81.5 79.0 - 97.0 fL    MCH 24.5 (L) 26.6 - 33.0 pg    MCHC 30.1 (L) 31.5 - 35.7 g/dL    RDW 14.2 12.3 - 15.4 %    RDW-SD 42.1 37.0 - 54.0 fl    MPV 11.4 6.0 - 12.0 fL    Platelets 157 140 - 450 10*3/mm3    Neutrophil % 80.0 (H) 42.7 - 76.0 %    Lymphocyte % 12.9 (L) 19.6 - 45.3 %    Monocyte % 5.8 5.0 - 12.0 %    Eosinophil % 0.6 0.3 - 6.2 %    Basophil % 0.2 0.0 - 1.5 %    Immature Grans % 0.5 0.0 - 0.5 %    Neutrophils, Absolute 8.39 (H) 1.70 - 7.00 10*3/mm3    Lymphocytes, Absolute 1.35 0.70 - 3.10 10*3/mm3    Monocytes, Absolute 0.61 0.10 - 0.90 10*3/mm3    Eosinophils, Absolute 0.06 0.00 - 0.40 10*3/mm3    Basophils, Absolute 0.02 0.00 - 0.20 10*3/mm3    Immature Grans, Absolute 0.05 0.00 - 0.05 10*3/mm3    nRBC 0.0 0.0 - 0.2 /100 WBC   POC Glucose Once    Collection Time: 05/20/24  7:15 AM    Specimen: Blood   Result Value Ref Range    Glucose 192 (H) 70 - 105 mg/dL      Imaging Results (Last 24 Hours)       Procedure Component Value Units Date/Time    FL < 1 Hour [[id number]] Resulted: 05/19/24 1435     Updated: 05/19/24 1435    Narrative:      This procedure was auto-finalized with no dictation required.              Assessment:    Hydronephrosis concurrent with and due to calculi of kidney and ureter      Bilateral ureteral stones    Plan:    Status post stents yesterday  Okay for discharge from urology standpoint we will arrange outpatient stone intervention    Leon Rincon MD  [facility]  [address], [address]  [place] [address]  Office: [phone]  Available via [employer] Secure Chat  24  09:51 EDT       Electronically signed by Leon Rincon MD at 24 0951          Consult Notes (last 48 hours)        Randal Cano MD at 24 0712          Urology Consult  Patient Identification:  Name: Yolanda Lovett  Age: 63 y.o.  Sex: female  : 1960  MRN: [id number]     Chief Complaint: bilateral flank pain    History of Present Illness: bilateral flank pain for 2 days. CT- bilateral ureteral and renal stones with hydro.      Problem List:  Active Hospital Problems    Diagnosis  POA    **Hydronephrosis concurrent with and due to calculi of kidney and ureter [N13.2]  Yes      Resolved Hospital Problems   No resolved problems to display.     Past Medical History:  Past Medical History:   Diagnosis Date    Arthritis     Coronary artery disease     Diabetes mellitus     Hyperlipidemia     Hypertension      Past Surgical History:  Past Surgical History:   Procedure Laterality Date    LAPAROSCOPIC TUBAL LIGATION      TONSILLECTOMY        Home Meds:  Medications Prior to Admission   Medication Sig Dispense Refill Last Dose    atenolol (TENORMIN) 100 MG tablet Take 1 tablet by mouth Daily.   2024    atorvastatin (LIPITOR) 80 MG tablet Take 1 tablet by mouth Every Night.   2024    ezetimibe (ZETIA) 10 MG tablet Take 1 tablet by mouth Every Night.   2024    Lantus SoloStar 100 UNIT/ML injection pen Inject 40 Units under the skin into the appropriate area as directed Daily. 40 units in AM, 30 units in PM   2024    losartan (COZAAR) 25 MG tablet Take 1 tablet by mouth Daily.    5/18/2024    metFORMIN (GLUCOPHAGE) 1000 MG tablet Take 1 tablet by mouth 2 (Two) Times a Day With Meals.   5/18/2024    sertraline (ZOLOFT) 100 MG tablet Take 1 tablet by mouth Daily.   5/18/2024     Current Meds:     Current Facility-Administered Medications:     acetaminophen (TYLENOL) tablet 650 mg, 650 mg, Oral, Q4H PRN **OR** acetaminophen (TYLENOL) 160 MG/5ML oral solution 650 mg, 650 mg, Oral, Q4H PRN **OR** acetaminophen (TYLENOL) suppository 650 mg, 650 mg, Rectal, Q4H PRN, Sammi Jordan, ARABELLA    atenolol (TENORMIN) tablet 100 mg, 100 mg, Oral, Daily, Sammi Jordan APRN    atorvastatin (LIPITOR) tablet 80 mg, 80 mg, Oral, Nightly, Sammi Jordan, ARABELLA    sennosides-docusate (PERICOLACE) 8.6-50 MG per tablet 2 tablet, 2 tablet, Oral, BID PRN **AND** polyethylene glycol (MIRALAX) packet 17 g, 17 g, Oral, Daily PRN **AND** bisacodyl (DULCOLAX) EC tablet 5 mg, 5 mg, Oral, Daily PRN **AND** bisacodyl (DULCOLAX) suppository 10 mg, 10 mg, Rectal, Daily PRN, Sammi Jordan APRN    cefTRIAXone (ROCEPHIN) 1,000 mg in sodium chloride 0.9 % 100 mL MBP, 1,000 mg, Intravenous, Q24H, Sammi Jordan APRN    dextrose (D50W) (25 g/50 mL) IV injection 25 g, 25 g, Intravenous, Q15 Min PRN, Sammi Jordan APRN    dextrose (GLUTOSE) oral gel 15 g, 15 g, Oral, Q15 Min PRN, Sammi Jordan APRN    glucagon (GLUCAGEN) injection 1 mg, 1 mg, Intramuscular, Q15 Min PRN, Sammi Jordan APRN    insulin glargine (LANTUS, SEMGLEE) injection 30 Units, 30 Units, Subcutaneous, Nightly, Sammi Jordan APRN    insulin glargine (LANTUS, SEMGLEE) injection 40 Units, 40 Units, Subcutaneous, Daily, Sammi Jordan APRN    insulin lispro (HUMALOG/ADMELOG) injection 3-14 Units, 3-14 Units, Subcutaneous, Q6H, Sammi Jordan APRN    losartan (COZAAR) tablet 25 mg, 25 mg, Oral, Daily, Sammi Jordan APRN    melatonin tablet 5 mg, 5 mg, Oral, Nightly PRN, Sammi Jordan APRN    morphine injection 4 mg, 4 mg,  "Intravenous, Q4H PRN, Sammi Jordan L, APRN    ondansetron (ZOFRAN) injection 4 mg, 4 mg, Intravenous, Q4H PRN, Yandy Jordany L, APRN    sertraline (ZOLOFT) tablet 100 mg, 100 mg, Oral, Daily, Yandy Jordany L, APRN    sodium chloride 0.9 % flush 10 mL, 10 mL, Intravenous, Q12H, Yandy Jordany L, APRN    sodium chloride 0.9 % flush 10 mL, 10 mL, Intravenous, PRN, Nikki, Sammi L, APRN    sodium chloride 0.9 % infusion 40 mL, 40 mL, Intravenous, PRN, Yandy Jordany L, APRN  Allergies:  No Known Allergies  Immunizations:    There is no immunization history on file for this patient.  Social History:   Social History     Tobacco Use    Smoking status: Every Day     Current packs/day: 1.00     Average packs/day: 1 pack/day for 25.0 years (25.0 ttl pk-yrs)     Types: Cigarettes    Smokeless tobacco: Never   Substance Use Topics    Alcohol use: Never      Family History:  History reviewed. No pertinent family history.   Review of Systems  negative 12 point system review except:  Objective:  tMax 24 hrs: Temp (24hrs), Av.6 °F (36.4 °C), Min:97.6 °F (36.4 °C), Max:97.6 °F (36.4 °C)    Vitals Ranges:   Temp:  [97.6 °F (36.4 °C)] 97.6 °F (36.4 °C)  Heart Rate:  [78] 78  Resp:  [18] 18  BP: (146)/(71) 146/71  Intake and Output Last 3 Shifts:   No intake/output data recorded.  Exam:  /71 (BP Location: Left arm, Patient Position: Sitting)   Pulse 78   Temp 97.6 °F (36.4 °C) (Axillary)   Resp 18   Ht 157.5 cm (62\")   Wt 66 kg (145 lb 9.6 oz)   SpO2 97%   BMI 26.63 kg/m²       General Appearance: Alert, cooperative, no distress, appears stated age   Head: Normocephalic, without obvious abnormality, atraumatic   ENT: Normal hearing, external inspection, ears and nose   Eyes: Normal pupils/irises, external inspection, conjunctivae, eyelids   Back: No CVA tenderness   Respiratory: Normal effort, palpation, auscultation   CV: Normal auscultation, carotid pulses, no edema   Abdomen: No hernia, soft, nontender, no " masses   :    Musculoskeletal: Normal extremities, nails, digits   Skin: Normal skin color, texture, no rashes or lesion   Neurologic/psych: Normal orientation, mood, affect           Data Review:  CBC:   Results from last 7 days   Lab Units 05/19/24  0413   WBC 10*3/mm3 11.09*   RBC 10*6/mm3 4.35     BMP:   Results from last 7 days   Lab Units 05/19/24  0413   GLUCOSE mg/dL 127*   CO2 mmol/L 24.0   BUN mg/dL 13   CREATININE mg/dL 0.85   CALCIUM mg/dL 9.1      Assessment:    Hydronephrosis concurrent with and due to calculi of kidney and ureter          Plan:  Cystoscopy, bilateral retrograde pyelogram and stents  R/b/o discussed    Randal Cano MD  5/19/2024        Electronically signed by Randal Cano MD at 05/19/24 0438

## 2024-05-21 NOTE — DISCHARGE SUMMARY
"             [facility] Medicine Services  Discharge Summary    Date of Service: 2024  Patient Name: Yolanda Lovett  : 1960  MRN: [id number]    Date of Admission: 2024  Discharge Diagnosis: Bilateral ureteral stones  Date of Discharge: 2024  Primary Care Physician: Yanet Coulter MD      Presenting Problem:   Hydronephrosis concurrent with and due to calculi of kidney and ureter [N13.2]    Active and Resolved Hospital Problems:  Active Hospital Problems    Diagnosis POA    **Hydronephrosis concurrent with and due to calculi of kidney and ureter [N13.2] Yes      Resolved Hospital Problems   No resolved problems to display.         Hospital Course     HPI:  Per the H&P written by Sammi Jordan, dated 2024:  \"History of Present Illness: Yolanda Lovett is a 63 y.o. female with a previous medical history of DM, hypertension, hyperlipidemia, and depression who presented to [facility] on 2024 with complaints of lower back pain radiating to her left lower abdomen along with associated nausea for the past 2 days.  She denies fevers, chills.     At [facility], BBC was 15.8.  Otherwise, labs were unremarkable.  UA showed 6-10 WBCs and trace bacteria. CT of the abdomen and pelvis reviewed, 3 adjacent calculi noted in the left mid ureter measuring up to 8.6 mm with moderate hydronephrosis and hydroureter, and perinephric edema.  The right distal ureter shows a collection of urinary calculi measuring up to 4.2 mm at the UVJ with moderate hydronephrosis and hydroureter and perinephric edema.  She was afebrile, all vital signs were stable.  Urology was consulted and agreed to see patient.  Hospitalist was consulted for transfer to [facility] for further management.\"    Hospital Course:  During the hospital course the patient was seen by urology and had a cystoscopy with bilateral retrograde pyelograms and bilateral ureteral stents placed.  She " tolerated the procedures well.  Urology cleared the patient for discharge home afterwards with a plan for outpatient stone intervention.  She will follow-up with urology in the outpatient clinic.  She was also IV antibiotics for treatment of UTI and is being discharged home on Macrobid for complete treatment of UTI.        DISCHARGE Follow Up Recommendations for labs and diagnostics: Follow-up with urology in 1 week.      Reasons For Change In Medications and Indications for New Medications:      Day of Discharge     Vital Signs:  Temp:  [97.6 °F (36.4 °C)-98.6 °F (37 °C)] 97.6 °F (36.4 °C)  Heart Rate:  [64-79] 66  Resp:  [16] 16  BP: (155-182)/(66-75) 182/75    Physical Exam:  Physical Exam  Constitutional:       Appearance: Normal appearance.   HENT:      Head: Normocephalic and atraumatic.      Nose: Nose normal.      Mouth/Throat:      Mouth: Mucous membranes are moist.   Eyes:      Extraocular Movements: Extraocular movements intact.      Pupils: Pupils are equal, round, and reactive to light.   Cardiovascular:      Rate and Rhythm: Normal rate and regular rhythm.   Pulmonary:      Effort: Pulmonary effort is normal.      Breath sounds: Normal breath sounds.   Abdominal:      General: Abdomen is flat. Bowel sounds are normal.      Palpations: Abdomen is soft.   Musculoskeletal:         General: Normal range of motion.      Cervical back: Normal range of motion and neck supple.   Skin:     General: Skin is warm and dry.      Capillary Refill: Capillary refill takes 2 to 3 seconds.   Neurological:      General: No focal deficit present.      Mental Status: She is alert and oriented to person, place, and time.   Psychiatric:         Mood and Affect: Mood normal.         Behavior: Behavior normal.         Thought Content: Thought content normal.         Judgment: Judgment normal.           Pertinent  and/or Most Recent Results     LAB RESULTS:      Lab 05/20/24  0418 05/19/24  0413   WBC 10.48 11.09*   HEMOGLOBIN  11.0* 10.7*   HEMATOCRIT 36.6 34.9   PLATELETS 157 158   NEUTROS ABS 8.39* 7.93*   IMMATURE GRANS (ABS) 0.05 0.07*   LYMPHS ABS 1.35 2.26   MONOS ABS 0.61 0.71   EOS ABS 0.06 0.10   MCV 81.5 80.2         Lab 05/20/24 0418 05/19/24 0413   SODIUM 139 140   POTASSIUM 3.7 3.1*   CHLORIDE 105 105   CO2 23.1 24.0   ANION GAP 10.9 11.0   BUN 13 13   CREATININE 0.67 0.85   EGFR 97.7 76.6   GLUCOSE 204* 127*   CALCIUM 9.1 9.1                         Brief Urine Lab Results       None          Microbiology Results (last 10 days)       Procedure Component Value - Date/Time    Blood Culture - Blood, Arm, Left [[id number]]  (Normal) Collected: 05/19/24 0413    Lab Status: Preliminary result Specimen: Blood from Arm, Left Updated: 05/20/24 0501     Blood Culture No growth at 24 hours    Blood Culture - Blood, Arm, Left [[id number]]  (Normal) Collected: 05/19/24 0413    Lab Status: Preliminary result Specimen: Blood from Arm, Left Updated: 05/20/24 0501     Blood Culture No growth at 24 hours                             Labs Pending at Discharge:  Pending Labs       Order Current Status    Blood Culture - Blood, Arm, Left Preliminary result    Blood Culture - Blood, Arm, Left Preliminary result            Procedures Performed  Procedure(s):  CYSTOSCOPY, BILATERAL RETROGRADE PYELOGRAMS, BILATERAL URETERAL STENTS         Consults:   Consults       No orders found for last 30 day(s).              Discharge Details        Discharge Medications        Continue These Medications        Instructions Start Date   atenolol 100 MG tablet  Commonly known as: TENORMIN   100 mg, Oral, Daily      atorvastatin 80 MG tablet  Commonly known as: LIPITOR   1 tablet, Oral, Nightly      ezetimibe 10 MG tablet  Commonly known as: ZETIA   1 tablet, Oral, Nightly      Lantus SoloStar 100 UNIT/ML injection pen  Generic drug: Insulin Glargine   40 Units, Subcutaneous, Daily, 40 units in AM, 30 units in PM      losartan 25 MG tablet  Commonly known as:  COZAAR   1 tablet, Oral, Daily      metFORMIN 1000 MG tablet  Commonly known as: GLUCOPHAGE   1,000 mg, Oral, 2 Times Daily With Meals      sertraline 100 MG tablet  Commonly known as: ZOLOFT   1 tablet, Oral, Daily           She is also being discharged on Macrobid 100 mg p.o. twice daily for 5 days    No Known Allergies      Discharge Disposition: Discharge home  Home or Self Care    Diet: Diabetic cardiac diet  Hospital:No active diet order        Discharge Activity:   Activity Instructions       Activity as Tolerated                CODE STATUS:  Code Status and Medical Interventions:   Ordered at: 05/19/24 0241     Code Status (Patient has no pulse and is not breathing):    CPR (Attempt to Resuscitate)     Medical Interventions (Patient has pulse or is breathing):    Full Support         No future appointments.    Additional Instructions for the Follow-ups that You Need to Schedule       Discharge Follow-up with PCP   As directed       Currently Documented PCP:    Yanet Coulter MD    PCP Phone Number:    [phone]     Follow Up Details: 1 weeks        Discharge Follow-up with Specified Provider: Urology in 1 week   As directed      To: Urology in 1 week                Time spent on Discharge including face to face service:  >30 minutes    Signature: Electronically signed by Karely Ashby MD, 05/20/24, 20:36 EDT.  [facility] Hospitalist Team

## 2024-05-21 NOTE — CASE MANAGEMENT/SOCIAL WORK
Case Management Discharge Note      Final Note: Routine home.    Provided Post Acute Provider List?: N/A  Provided Post Acute Provider Quality & Resource List?: N/A    Selected Continued Care - Discharged on 5/20/2024 Admission date: 5/19/2024 - Discharge disposition: Home or Self Care       Transportation Services  Private: Car    Final Discharge Disposition Code: 01 - home or self-care

## 2024-05-24 LAB
BACTERIA SPEC AEROBE CULT: NORMAL
BACTERIA SPEC AEROBE CULT: NORMAL